# Patient Record
Sex: MALE | Race: WHITE | Employment: OTHER | URBAN - METROPOLITAN AREA
[De-identification: names, ages, dates, MRNs, and addresses within clinical notes are randomized per-mention and may not be internally consistent; named-entity substitution may affect disease eponyms.]

---

## 2020-11-16 ENCOUNTER — HOSPITAL ENCOUNTER (OUTPATIENT)
Age: 70
Setting detail: SPECIMEN
Discharge: HOME | End: 2020-11-16
Payer: MEDICARE

## 2020-11-16 PROCEDURE — 87070 CULTURE OTHR SPECIMN AEROBIC: CPT

## 2020-11-16 PROCEDURE — 87075 CULTR BACTERIA EXCEPT BLOOD: CPT

## 2020-11-22 LAB
CULTURE: NORMAL
Lab: NORMAL
SPECIMEN: NORMAL

## 2021-02-01 LAB
ALBUMIN SERPL-MCNC: 4.6 G/DL
ALP BLD-CCNC: 73 U/L
ALT SERPL-CCNC: 21 U/L
ANION GAP SERPL CALCULATED.3IONS-SCNC: NORMAL MMOL/L
AST SERPL-CCNC: 16 U/L
BASOPHILS ABSOLUTE: 0.1 /ΜL
BASOPHILS RELATIVE PERCENT: 1 %
BILIRUB SERPL-MCNC: 0.6 MG/DL (ref 0.1–1.4)
BUN BLDV-MCNC: 15 MG/DL
CALCIUM SERPL-MCNC: 9.5 MG/DL
CHLORIDE BLD-SCNC: 106 MMOL/L
CO2: 25 MMOL/L
CREAT SERPL-MCNC: 1.01 MG/DL
EOSINOPHILS ABSOLUTE: 0.1 /ΜL
EOSINOPHILS RELATIVE PERCENT: 2 %
GFR CALCULATED: NORMAL
GLUCOSE BLD-MCNC: 124 MG/DL
HCT VFR BLD CALC: 48.3 % (ref 41–53)
HEMOGLOBIN: 17.3 G/DL (ref 13.5–17.5)
LYMPHOCYTES ABSOLUTE: 1.8 /ΜL
LYMPHOCYTES RELATIVE PERCENT: 30 %
MCH RBC QN AUTO: 33.7 PG
MCHC RBC AUTO-ENTMCNC: 35.8 G/DL
MCV RBC AUTO: 94 FL
MONOCYTES ABSOLUTE: 0.6 /ΜL
MONOCYTES RELATIVE PERCENT: 10 %
NEUTROPHILS ABSOLUTE: 3.5 /ΜL
NEUTROPHILS RELATIVE PERCENT: 57 %
PLATELET # BLD: 144 K/ΜL
PMV BLD AUTO: NORMAL FL
POTASSIUM SERPL-SCNC: 3.9 MMOL/L
RBC # BLD: 5.14 10^6/ΜL
SODIUM BLD-SCNC: 149 MMOL/L
TOTAL PROTEIN: 6.6
WBC # BLD: 6.1 10^3/ML

## 2021-02-11 ENCOUNTER — OFFICE VISIT (OUTPATIENT)
Dept: CARDIOLOGY CLINIC | Age: 71
End: 2021-02-11
Payer: MEDICARE

## 2021-02-11 ENCOUNTER — PROCEDURE VISIT (OUTPATIENT)
Dept: CARDIOLOGY CLINIC | Age: 71
End: 2021-02-11
Payer: MEDICARE

## 2021-02-11 VITALS
HEIGHT: 72 IN | DIASTOLIC BLOOD PRESSURE: 90 MMHG | BODY MASS INDEX: 33.54 KG/M2 | WEIGHT: 247.6 LBS | SYSTOLIC BLOOD PRESSURE: 146 MMHG | HEART RATE: 73 BPM

## 2021-02-11 DIAGNOSIS — I10 ESSENTIAL HYPERTENSION: ICD-10-CM

## 2021-02-11 DIAGNOSIS — Z01.818 PRE-OPERATIVE CLEARANCE: Primary | ICD-10-CM

## 2021-02-11 DIAGNOSIS — E78.5 HYPERLIPIDEMIA, UNSPECIFIED HYPERLIPIDEMIA TYPE: ICD-10-CM

## 2021-02-11 DIAGNOSIS — Z01.818 PRE-OP EVALUATION: Primary | ICD-10-CM

## 2021-02-11 DIAGNOSIS — R94.31 ABNORMAL EKG: ICD-10-CM

## 2021-02-11 DIAGNOSIS — M17.9 OSTEOARTHRITIS OF KNEE, UNSPECIFIED LATERALITY, UNSPECIFIED OSTEOARTHRITIS TYPE: ICD-10-CM

## 2021-02-11 DIAGNOSIS — R01.1 MURMUR, CARDIAC: ICD-10-CM

## 2021-02-11 DIAGNOSIS — E66.09 CLASS 1 OBESITY DUE TO EXCESS CALORIES WITH BODY MASS INDEX (BMI) OF 33.0 TO 33.9 IN ADULT, UNSPECIFIED WHETHER SERIOUS COMORBIDITY PRESENT: ICD-10-CM

## 2021-02-11 LAB
LV EF: 45 %
LV EF: 58 %
LVEF MODALITY: NORMAL
LVEF MODALITY: NORMAL

## 2021-02-11 PROCEDURE — 93016 CV STRESS TEST SUPVJ ONLY: CPT | Performed by: INTERNAL MEDICINE

## 2021-02-11 PROCEDURE — 93000 ELECTROCARDIOGRAM COMPLETE: CPT | Performed by: INTERNAL MEDICINE

## 2021-02-11 PROCEDURE — 93017 CV STRESS TEST TRACING ONLY: CPT | Performed by: INTERNAL MEDICINE

## 2021-02-11 PROCEDURE — A9500 TC99M SESTAMIBI: HCPCS | Performed by: INTERNAL MEDICINE

## 2021-02-11 PROCEDURE — 99204 OFFICE O/P NEW MOD 45 MIN: CPT | Performed by: INTERNAL MEDICINE

## 2021-02-11 PROCEDURE — 3017F COLORECTAL CA SCREEN DOC REV: CPT | Performed by: INTERNAL MEDICINE

## 2021-02-11 PROCEDURE — 93018 CV STRESS TEST I&R ONLY: CPT | Performed by: INTERNAL MEDICINE

## 2021-02-11 PROCEDURE — G8484 FLU IMMUNIZE NO ADMIN: HCPCS | Performed by: INTERNAL MEDICINE

## 2021-02-11 PROCEDURE — 1036F TOBACCO NON-USER: CPT | Performed by: INTERNAL MEDICINE

## 2021-02-11 PROCEDURE — 4040F PNEUMOC VAC/ADMIN/RCVD: CPT | Performed by: INTERNAL MEDICINE

## 2021-02-11 PROCEDURE — 93306 TTE W/DOPPLER COMPLETE: CPT | Performed by: INTERNAL MEDICINE

## 2021-02-11 PROCEDURE — 78452 HT MUSCLE IMAGE SPECT MULT: CPT | Performed by: INTERNAL MEDICINE

## 2021-02-11 PROCEDURE — G8417 CALC BMI ABV UP PARAM F/U: HCPCS | Performed by: INTERNAL MEDICINE

## 2021-02-11 PROCEDURE — G8427 DOCREV CUR MEDS BY ELIG CLIN: HCPCS | Performed by: INTERNAL MEDICINE

## 2021-02-11 PROCEDURE — 1123F ACP DISCUSS/DSCN MKR DOCD: CPT | Performed by: INTERNAL MEDICINE

## 2021-02-11 RX ORDER — AMLODIPINE BESYLATE 10 MG/1
10 TABLET ORAL DAILY
COMMUNITY

## 2021-02-11 RX ORDER — METOPROLOL TARTRATE 50 MG/1
50 TABLET, FILM COATED ORAL 2 TIMES DAILY
COMMUNITY

## 2021-02-11 RX ORDER — OLMESARTAN MEDOXOMIL AND HYDROCHLOROTHIAZIDE 40/12.5 40; 12.5 MG/1; MG/1
40 TABLET ORAL DAILY
COMMUNITY
Start: 2021-01-07

## 2021-02-11 RX ORDER — NIACINAMIDE 500 MG
500 TABLET ORAL DAILY
COMMUNITY

## 2021-02-11 NOTE — PROGRESS NOTES
Kandy Ba MD                                  CARDIOLOGY  NOTE        Referring Physician: Fatmata Warren MD    Thank you for consultation. Chief Complaint:    Chief Complaint   Patient presents with    New Patient    Hypertension    Pre op risk assessment     HPI:     Ganesh Hernandez is a 69y.o. year old male with prior medical history significant for resistant hypertension, osteoarthritis of the left knee presents to the clinic to establish care and for preop risk assessment. Patient denies any prior established history of coronary artery disease congestive heart failure or arrhythmias  Patient is a lifelong non-smoker denies any alcohol or drug abuse. Patient has chronic osteoarthritis of the knee for which she anticipates surgery in the next 2 weeks. Due to his osteoarthritis patient has limited ambulation and can hardly walk 8 to 10 feet due to pain. At rest patient denies any chest pain dyspnea or palpitations. Patient functional capacity could not be gauged due to limited mobility. EKG shows normal sinus rhythm without any ischemic changes nonspecific ST-T changes noted. Current Outpatient Medications   Medication Sig Dispense Refill    olmesartan-hydroCHLOROthiazide (BENICAR HCT) 40-12.5 MG per tablet Take 40 tablets by mouth daily      metoprolol tartrate (LOPRESSOR) 50 MG tablet Take 50 mg by mouth 2 times daily      amLODIPine (NORVASC) 10 MG tablet Take 10 mg by mouth daily      niacinamide 500 MG tablet Take 500 mg by mouth daily       No current facility-administered medications for this visit. Allergies:     Penicillins    Patient History:    Past Medical History:   Diagnosis Date    History of EKG 02/01/2021    History of EKG 09/22/2017     History reviewed. No pertinent surgical history. History reviewed. No pertinent family history.   Social History     Tobacco Use    Smoking status: Never Smoker    Smokeless tobacco: Never Used Substance Use Topics    Alcohol use: Not on file        Review of Systems:     · Constitutional:  No Fever or Weight Loss   · Eyes: No Decreased Vision  · ENT: No Headaches, Hearing Loss or Vertigo  · Cardiovascular: No Chest Pain,  No Shortness of breath, No Palpitations. No Edema   · Respiratory: No cough or wheezing . No Respiratory distress   · Gastrointestinal: No abdominal pain, appetite loss, blood in stools, constipation, diarrhea or heartburn  · Genitourinary: No dysuria, trouble voiding, or hematuria  · Musculoskeletal:  denies any new  joint aches , or pain   · Integumentary: No rash or pruritis  · Neurological: No TIA or stroke symptoms  · Psychiatric: No anxiety or depression  · Endocrine: No malaise, fatigue or temperature intolerance  · Hematologic/Lymphatic: No bleeding problems, blood clots or swollen lymph nodes  · Allergic/Immunologic: No nasal congestion or hives        Objective:      Physical Exam:    BP (!) 146/90   Pulse 73   Ht 6' (1.829 m)   Wt 247 lb 9.6 oz (112.3 kg)   BMI 33.58 kg/m²   Wt Readings from Last 3 Encounters:   02/11/21 247 lb 9.6 oz (112.3 kg)     Body mass index is 33.58 kg/m². Vitals:    02/11/21 0832   BP: (!) 146/90   Pulse: 73        General Appearance and Constitutional: Conversant, Well developed, Well nourished, No acute distress, Non-toxic appearance. HEENT:  Normocephalic, Atraumatic, Bilateral external ears normal, Oropharynx moist, No oral exudates,   Nose normal.   Neck- Normal range of motion, No tenderness, Supple  Eyes:  EOMI, Conjunctiva normal, No discharge. Respiratory:  Normal breath sounds, No respiratory distress, No wheezing, No Rales, No Ronchi. No chest tenderness. Cardiovascular: S1-S2, no added heart sounds, soft systolic Mumur appreciated. No gallops, rubs. No Pedal Edema   GI:  Bowel sounds normal, Soft, No tenderness,  :  No costovertebral angle tenderness   Musculoskeletal:  No gross deformities.  Back- No tenderness Integument:  Well hydrated, no rash   Lymphatic:  No lymphadenopathy noted   Neurologic:  Alert & oriented x 3, Normal motor function, normal sensory function, no focal deficits noted   Psychiatric:  Speech and behavior appropriate       Medical decision making and Data review:    DATA:    No results found for: TROPONINT  BNP:  No results found for: PROBNP  PT/INR:  No results found for: PTINR  No results found for: LABA1C  No results found for: CHOL, TRIG, HDL, LDLCALC, LDLDIRECT  Lab Results   Component Value Date    WBC 6.1 02/01/2021    HGB 17.3 02/01/2021    HCT 48.3 02/01/2021    MCV 94 02/01/2021     02/01/2021     TSH: No results found for: TSH  Lab Results   Component Value Date    AST 16 02/01/2021    ALT 21 02/01/2021    BILIDIR 0.2 10/31/2012    BILITOT 0.6 02/01/2021    ALKPHOS 73 02/01/2021         All labs, medications and tests reviewed by myself including data and history from outside source , patient and available family . 1. Pre-operative clearance    2. Essential hypertension    3. Osteoarthritis of knee, unspecified laterality, unspecified osteoarthritis type    4. Hyperlipidemia, unspecified hyperlipidemia type    5. Murmur, cardiac    6. Class 1 obesity due to excess calories with body mass index (BMI) of 33.0 to 33.9 in adult, unspecified whether serious comorbidity present         Impression and Plan:      1. Preop cardiac risk assessment: Patient has risk factors including hypertension and hyperlipidemia, male sex and advanced age. Due to limited mobility functional capacity could not be gauged. Patient can hardly walk 8 to 10 feet before stopping due to pain. Will obtain Lexiscan MPI for risk stratification. 2. Soft systolic murmur: Likely functional murmur, will obtain echocardiogram to rule out any valvular heart disease/structural heart disease. 3. Hypertension: Blood pressure 146/90 today in the office. However patient mentions his blood pressure is fairly stable at home with systolic in the range of 934Q to 130s. Continue with amlodipine, metoprolol, Benicar. 4. Hyperlipidemia patient is on niacinamide. Management as per primary care physician. 5. Obesity with BMI of 33.58: Patient was counseled for low-salt low-fat diet. Exercise as tolerated. Return in about 1 month (around 3/11/2021). Counseled extensively and medication compliance urged. We discussed that for the  prevention of ASCVD our  goal is aggressive risk modification. Patient is encouraged to exercise even a brisk walk for 30 minutes  at least 3 to 4 times a week   Various goals were discussed and questions answered. Continue current medications. Appropriate prescriptions are addressed and refills ordered. Questions answered and patient verbalizes understanding. Call for any problems, questions, or concerns.

## 2021-02-11 NOTE — LETTER
Keara Ro  [birth year]  [id number]    Have you had any Chest Pain that is not new? - No      ? DO EKG IF: Patient has a Heart Rate above 100 or below 40     CAD (Coronary Artery Disease) patient should have one on file every 6 months        Have you had any Shortness of Breath - No    Have you had any dizziness - No    ? Sitting wait 5 minutes do supine (laying down) wait 5 minutes then do standing - log each in \"vitals\" area in Epic  ? Be sure to ask what symptoms they are having if they get dizzy while completing ortho stats such as room spinning, nausea, etc.    Have you had any palpitations that are not new? - No      Do you have any edema -     Do you have a surgery or procedure scheduled in the near future - Yes  If Yes- DO EKG  If Yes - Who is the surgery with?  Dr. Roverto Vora   Phone number of physician   Fax number of physician   Type of surgery Left Total Knee 2/23/2021

## 2021-02-15 ENCOUNTER — TELEPHONE (OUTPATIENT)
Dept: CARDIOLOGY CLINIC | Age: 71
End: 2021-02-15

## 2021-02-15 NOTE — TELEPHONE ENCOUNTER
Notified pt of results he will come in Thursday to see Dr. Juvenal Xiong.              Summary   Left ventricular systolic function is normal with an ejection fraction of   55-60%. Mild septal wall asymmetrical left ventricular hypertrophy. Grade I diastolic dysfunction. Dilated aortic root(3.8 cm). No significant valvular regurgitation noted. Normal pulmonary artery pressure with a RVSP of 28mmHg.    No evidence of pericardial effusion.            Summary    Supervising physician Dr. Darren Nur .    Medium sized defect of mild severity which is persistent involving anterior    wall of myocardium, likely represent myocardial ischemia    Fixed inferior defect secondary to increased hepatic and gut uptake    (artifact).    Low EF 45 %        Recommendation    Abnormal Stress MPI    Recommend Left Heart Cath before elective surgery    OV to discuss

## 2021-02-18 ENCOUNTER — HOSPITAL ENCOUNTER (OUTPATIENT)
Dept: GENERAL RADIOLOGY | Age: 71
Discharge: HOME | End: 2021-02-18
Payer: MEDICARE

## 2021-02-18 ENCOUNTER — OFFICE VISIT (OUTPATIENT)
Dept: CARDIOLOGY CLINIC | Age: 71
End: 2021-02-18
Payer: MEDICARE

## 2021-02-18 ENCOUNTER — HOSPITAL ENCOUNTER (OUTPATIENT)
Age: 71
Discharge: HOME | End: 2021-02-18
Payer: MEDICARE

## 2021-02-18 ENCOUNTER — TELEPHONE (OUTPATIENT)
Dept: CARDIOLOGY CLINIC | Age: 71
End: 2021-02-18

## 2021-02-18 VITALS
DIASTOLIC BLOOD PRESSURE: 84 MMHG | HEIGHT: 72 IN | BODY MASS INDEX: 33.18 KG/M2 | SYSTOLIC BLOOD PRESSURE: 130 MMHG | HEART RATE: 84 BPM | WEIGHT: 245 LBS

## 2021-02-18 DIAGNOSIS — Z01.810 PRE-OPERATIVE CARDIOVASCULAR EXAMINATION: ICD-10-CM

## 2021-02-18 DIAGNOSIS — E78.2 MIXED HYPERLIPIDEMIA: Primary | ICD-10-CM

## 2021-02-18 DIAGNOSIS — R01.1 MURMUR, CARDIAC: ICD-10-CM

## 2021-02-18 DIAGNOSIS — Z01.818 PRE-OP EVALUATION: ICD-10-CM

## 2021-02-18 DIAGNOSIS — Z01.818 PREOP TESTING: ICD-10-CM

## 2021-02-18 DIAGNOSIS — R94.39 ABNORMAL STRESS TEST: ICD-10-CM

## 2021-02-18 DIAGNOSIS — I10 ESSENTIAL HYPERTENSION: ICD-10-CM

## 2021-02-18 DIAGNOSIS — E66.09 CLASS 1 OBESITY DUE TO EXCESS CALORIES WITHOUT SERIOUS COMORBIDITY WITH BODY MASS INDEX (BMI) OF 33.0 TO 33.9 IN ADULT: ICD-10-CM

## 2021-02-18 LAB
ABO/RH: NORMAL
ANION GAP SERPL CALCULATED.3IONS-SCNC: 9 MMOL/L (ref 4–16)
ANTIBODY SCREEN: NEGATIVE
APTT: 27.5 SECONDS (ref 25.1–37.1)
BASOPHILS ABSOLUTE: 0.1 K/CU MM
BASOPHILS RELATIVE PERCENT: 1 % (ref 0–1)
BUN BLDV-MCNC: 15 MG/DL (ref 6–23)
CALCIUM SERPL-MCNC: 9.4 MG/DL (ref 8.3–10.6)
CHLORIDE BLD-SCNC: 102 MMOL/L (ref 99–110)
CO2: 32 MMOL/L (ref 21–32)
COMMENT: NORMAL
CREAT SERPL-MCNC: 1 MG/DL (ref 0.9–1.3)
DIFFERENTIAL TYPE: ABNORMAL
EOSINOPHILS ABSOLUTE: 0.1 K/CU MM
EOSINOPHILS RELATIVE PERCENT: 1.6 % (ref 0–3)
GFR AFRICAN AMERICAN: >60 ML/MIN/1.73M2
GFR NON-AFRICAN AMERICAN: >60 ML/MIN/1.73M2
GLUCOSE BLD-MCNC: 101 MG/DL (ref 70–99)
HCT VFR BLD CALC: 50.5 % (ref 42–52)
HEMOGLOBIN: 17.8 GM/DL (ref 13.5–18)
IMMATURE NEUTROPHIL %: 0.3 % (ref 0–0.43)
INR BLD: 0.94 INDEX
LYMPHOCYTES ABSOLUTE: 1.9 K/CU MM
LYMPHOCYTES RELATIVE PERCENT: 27.4 % (ref 24–44)
MCH RBC QN AUTO: 33.1 PG (ref 27–31)
MCHC RBC AUTO-ENTMCNC: 35.2 % (ref 32–36)
MCV RBC AUTO: 94 FL (ref 78–100)
MONOCYTES ABSOLUTE: 0.6 K/CU MM
MONOCYTES RELATIVE PERCENT: 9 % (ref 0–4)
NUCLEATED RBC %: 0 %
PDW BLD-RTO: 13.2 % (ref 11.7–14.9)
PLATELET # BLD: 151 K/CU MM (ref 140–440)
PMV BLD AUTO: 11.4 FL (ref 7.5–11.1)
POTASSIUM SERPL-SCNC: 3.7 MMOL/L (ref 3.5–5.1)
PROTHROMBIN TIME: 11.4 SECONDS (ref 11.7–14.5)
RBC # BLD: 5.37 M/CU MM (ref 4.6–6.2)
SEGMENTED NEUTROPHILS ABSOLUTE COUNT: 4.3 K/CU MM
SEGMENTED NEUTROPHILS RELATIVE PERCENT: 60.7 % (ref 36–66)
SODIUM BLD-SCNC: 143 MMOL/L (ref 135–145)
TOTAL IMMATURE NEUTOROPHIL: 0.02 K/CU MM
TOTAL NUCLEATED RBC: 0 K/CU MM
WBC # BLD: 7.1 K/CU MM (ref 4–10.5)

## 2021-02-18 PROCEDURE — 3017F COLORECTAL CA SCREEN DOC REV: CPT | Performed by: INTERNAL MEDICINE

## 2021-02-18 PROCEDURE — 4040F PNEUMOC VAC/ADMIN/RCVD: CPT | Performed by: INTERNAL MEDICINE

## 2021-02-18 PROCEDURE — 86901 BLOOD TYPING SEROLOGIC RH(D): CPT

## 2021-02-18 PROCEDURE — 1036F TOBACCO NON-USER: CPT | Performed by: INTERNAL MEDICINE

## 2021-02-18 PROCEDURE — 85025 COMPLETE CBC W/AUTO DIFF WBC: CPT

## 2021-02-18 PROCEDURE — 85730 THROMBOPLASTIN TIME PARTIAL: CPT

## 2021-02-18 PROCEDURE — G8427 DOCREV CUR MEDS BY ELIG CLIN: HCPCS | Performed by: INTERNAL MEDICINE

## 2021-02-18 PROCEDURE — G8484 FLU IMMUNIZE NO ADMIN: HCPCS | Performed by: INTERNAL MEDICINE

## 2021-02-18 PROCEDURE — 85610 PROTHROMBIN TIME: CPT

## 2021-02-18 PROCEDURE — 86850 RBC ANTIBODY SCREEN: CPT

## 2021-02-18 PROCEDURE — G8417 CALC BMI ABV UP PARAM F/U: HCPCS | Performed by: INTERNAL MEDICINE

## 2021-02-18 PROCEDURE — 36415 COLL VENOUS BLD VENIPUNCTURE: CPT

## 2021-02-18 PROCEDURE — 86900 BLOOD TYPING SEROLOGIC ABO: CPT

## 2021-02-18 PROCEDURE — 93005 ELECTROCARDIOGRAM TRACING: CPT | Performed by: INTERNAL MEDICINE

## 2021-02-18 PROCEDURE — 80048 BASIC METABOLIC PNL TOTAL CA: CPT

## 2021-02-18 PROCEDURE — 99213 OFFICE O/P EST LOW 20 MIN: CPT | Performed by: INTERNAL MEDICINE

## 2021-02-18 PROCEDURE — 71046 X-RAY EXAM CHEST 2 VIEWS: CPT

## 2021-02-18 PROCEDURE — 1123F ACP DISCUSS/DSCN MKR DOCD: CPT | Performed by: INTERNAL MEDICINE

## 2021-02-18 NOTE — PROGRESS NOTES
Marlyn Fitch MD                                  CARDIOLOGY  NOTE        Chief Complaint:    Chief Complaint   Patient presents with    Results     pt. here today to review echo and abn NM testing. Pt. denies chest pain, palpitations, SOB, dizziness and edema.  Cardiac Clearance     pt. is scheduled to have L total knee replacement on 2/23/21    Pre op risk assessment     Denies any chest pain shortness of breath or palpitations      Stress MPI 2/11/2021     Conclusions       Dorethia Jeans physician Dr. Yesenia Perez .    Medium sized defect of mild severity which is persistent involving anterior    wall of myocardium, likely represent myocardial ischemia    Fixed inferior defect secondary to increased hepatic and gut uptake    (artifact).    Low EF 45 %        Recommendation    Abnormal Stress MPI    Recommend Left Heart Cath before elective surgery    OV to discuss     Echocardiogram 2/11/2021     Summary   Left ventricular systolic function is normal with an ejection fraction of   55-60%. Mild septal wall asymmetrical left ventricular hypertrophy. Grade I diastolic dysfunction. Dilated aortic root(3.8 cm). No significant valvular regurgitation noted. Normal pulmonary artery pressure with a RVSP of 28mmHg. No evidence of pericardial effusion. Recent HPI:     Laci Greenwood is a 69y.o. year old male with prior medical history significant for resistant hypertension, osteoarthritis of the left knee presents to the clinic to establish care and for preop risk assessment. Patient denies any prior established history of coronary artery disease congestive heart failure or arrhythmias  Patient is a lifelong non-smoker denies any alcohol or drug abuse. Patient has chronic osteoarthritis of the knee for which she anticipates surgery in the next 2 weeks. Due to his osteoarthritis patient has limited ambulation and can hardly walk 8 to 10 feet due to pain. At rest patient denies any chest pain dyspnea or palpitations. Patient functional capacity could not be gauged due to limited mobility. EKG shows normal sinus rhythm without any ischemic changes nonspecific ST-T changes noted. Current Outpatient Medications   Medication Sig Dispense Refill    olmesartan-hydroCHLOROthiazide (BENICAR HCT) 40-12.5 MG per tablet Take 40 tablets by mouth daily      metoprolol tartrate (LOPRESSOR) 50 MG tablet Take 50 mg by mouth 2 times daily      amLODIPine (NORVASC) 10 MG tablet Take 10 mg by mouth daily      niacinamide 500 MG tablet Take 500 mg by mouth daily       No current facility-administered medications for this visit. Allergies:     Penicillins    Patient History:    Past Medical History:   Diagnosis Date    Abnormal nuclear stress test 02/11/2021    Medium sized defect of mild severity which is persistent involving anterior  wall of myocardium, likely represent myocardial ischemia.  Essential hypertension     H/O echocardiogram 02/11/2021    Mild LVH, No significant valvular regurgitation noted.  History of EKG 02/01/2021    Mixed hyperlipidemia     Murmur, cardiac     Obesity      History reviewed. No pertinent surgical history. History reviewed. No pertinent family history. Social History     Tobacco Use    Smoking status: Never Smoker    Smokeless tobacco: Never Used   Substance Use Topics    Alcohol use: Not on file        Review of Systems:     · Constitutional:  No Fever or Weight Loss   · Eyes: No Decreased Vision  · ENT: No Headaches, Hearing Loss or Vertigo  · Cardiovascular: No Chest Pain,  No Shortness of breath, No Palpitations. No Edema   · Respiratory: No cough or wheezing .  No Respiratory distress   · Gastrointestinal: No abdominal pain, appetite loss, blood in stools, constipation, diarrhea or heartburn  · Genitourinary: No dysuria, trouble voiding, or hematuria  · Musculoskeletal:  denies any new  joint aches , or pain · Integumentary: No rash or pruritis  · Neurological: No TIA or stroke symptoms  · Psychiatric: No anxiety or depression  · Endocrine: No malaise, fatigue or temperature intolerance  · Hematologic/Lymphatic: No bleeding problems, blood clots or swollen lymph nodes  · Allergic/Immunologic: No nasal congestion or hives        Objective:      Physical Exam:    /84   Pulse 84   Ht 6' (1.829 m)   Wt 245 lb (111.1 kg)   BMI 33.23 kg/m²   Wt Readings from Last 3 Encounters:   02/18/21 245 lb (111.1 kg)   02/11/21 247 lb 9.6 oz (112.3 kg)     Body mass index is 33.23 kg/m². Vitals:    02/18/21 1459   BP: 130/84   Pulse: 84        General Appearance and Constitutional: Conversant, Well developed, Well nourished, No acute distress, Non-toxic appearance. HEENT:  Normocephalic, Atraumatic, Bilateral external ears normal, Oropharynx moist, No oral exudates,   Nose normal.   Neck- Normal range of motion, No tenderness, Supple  Eyes:  EOMI, Conjunctiva normal, No discharge. Respiratory:  Normal breath sounds, No respiratory distress, No wheezing, No Rales, No Ronchi. No chest tenderness. Cardiovascular: S1-S2, no added heart sounds, soft systolic Mumur appreciated. No gallops, rubs. No Pedal Edema   GI:  Bowel sounds normal, Soft, No tenderness,  :  No costovertebral angle tenderness   Musculoskeletal:  No gross deformities.  Back- No tenderness  Integument:  Well hydrated, no rash   Lymphatic:  No lymphadenopathy noted   Neurologic:  Alert & oriented x 3, Normal motor function, normal sensory function, no focal deficits noted   Psychiatric:  Speech and behavior appropriate       Medical decision making and Data review:    DATA:    No results found for: TROPONINT  BNP:  No results found for: PROBNP  PT/INR:  No results found for: PTINR  No results found for: LABA1C  No results found for: CHOL, TRIG, HDL, LDLCALC, LDLDIRECT  Lab Results   Component Value Date    WBC 6.1 02/01/2021    HGB 17.3 02/01/2021 HCT 48.3 02/01/2021    MCV 94 02/01/2021     02/01/2021     TSH: No results found for: TSH  Lab Results   Component Value Date    AST 16 02/01/2021    ALT 21 02/01/2021    BILIDIR 0.2 10/31/2012    BILITOT 0.6 02/01/2021    ALKPHOS 73 02/01/2021         All labs, medications and tests reviewed by myself including data and history from outside source , patient and available family . 1. Mixed hyperlipidemia    2. Essential hypertension    3. Class 1 obesity due to excess calories without serious comorbidity with body mass index (BMI) of 33.0 to 33.9 in adult    4. Pre-op evaluation    5. Murmur, cardiac    6. Abnormal stress test         Impression and Plan:      1. Preop cardiac risk assessment: Patient has risk factors including hypertension and hyperlipidemia, male sex and advanced age. Brittany Ramos MPI for risk stratification was obtained, which is abnormal - anterior wall ischemia. Will obtain LHC to further delineate coronary anatomy. Pros and cons were discussed with the patient. Patient consents to the procedure  2. Soft systolic murmur: Echocardiogram normal. Likely functional murmur  3. Hypertension: Blood pressure 130/84 today in the office. However patient mentions his blood pressure is fairly stable at home with systolic in the range of 214R to 130s. Continue with amlodipine, metoprolol, Benicar. 4. Hyperlipidemia patient is on niacinamide. Management as per primary care physician. 5. Obesity with BMI of 33.58: Patient was counseled for low-salt low-fat diet. Exercise as tolerated. Patient knee surgery is anticipated Tuesday   Will get [facility] Monday       Return in about 1 month (around 3/18/2021). Counseled extensively and medication compliance urged. We discussed that for the  prevention of ASCVD our  goal is aggressive risk modification. Patient is encouraged to exercise even a brisk walk for 30 minutes  at least 3 to 4 times a week   Various goals were discussed and questions answered. Continue current medications. Appropriate prescriptions are addressed and refills ordered. Questions answered and patient verbalizes understanding. Call for any problems, questions, or concerns.

## 2021-02-18 NOTE — LETTER
Radha Baldwin  [birth year]  [id number]    Have you had any Chest Pain that is not new? - No    Have you had any Shortness of Breath - No    Have you had any dizziness - No    Have you had any palpitations that are not new? - No        Is the patient on any of the following medications - no    Do you have any edema - swelling in No    If Yes - CHECK TO SEE IF THE EDEMA IS PITTING    If Yes - Have they worn compression stockings No    Vein \"LEG PROBLEM Questionnaire\"  1. Do you have prominent leg veins? No   2. Do you have any skin discoloration? No  3. Do you have any healed or active sores? No  4. Do you have swelling of the legs? No  5. Do you have a family history of varicose veins? No  6. Does your profession involve pro-longed        standing or heavy lifting? No  7. Have you been fighting overweight problems? No  8. Do you have restless legs? No  9. Do you have any night time cramps? No  10. Do you have any of the following in your legs:       No  Do you have a surgery or procedure scheduled in the near future - Yes    If Yes - Who is the surgery with?  Dr. Kirsten Castro  Phone number of physician   Fax number of physician   Type of surgery L total knee replacement

## 2021-02-18 NOTE — LETTER
Isabela Plata 67 WITH POSSIBLE PERCUTANEOUS CORONARY INTERVENTION     Patient Name: Shadi Daily   : 1950   MRN# [id number]    Date of Procedure: 21 Time: 12 Arrival Time: 930    The catheterization and angiogram are usually outpatient procedures, however if stenting is needed you will stay overnight. You will need to be at the hospital two hours before the procedure. You will need to arrange for someone to drive you home. You will go to registration in the main lobby. HOSPITAL:  [facility])  Call to [facility] at: [phone] 1-2 days before your procedure. Please have blood work and chest-x-ray done 1 to 2 days before procedure at    Psychiatric. X Please do not have anything by mouth after midnight prior to or 8 hours before the procedure. X You may take your medications with a sip of water in the morning before your procedure or  take them with you. X If you are taking olmesartan HCTZ (Hydrochlorothiazide) please do not take it the morning before your procedure. X Please have COVID-19 Test done on upon arrival  after labs and Xray are completed. You will need to Quarantine yourself until procedure. Patient Signature:  _________________________ Staff Signature: Itzel Jauregui   Staff Given Instructions:_______________________________        Isabela Boswell",     Patient Name: Shadi Daily   : 1950   MRN# [id number]    Date of Procedure: 21 Time: 12     DIAGNOSIS:   Z01.810    LEFT HEART CATHETERIZATION WITH POSSIBLE PERCUTANEOUS CORONARY INTERVENTION       X Chest x-Ray PA & Lateral View     XEKG   X Type & Screen     X CBC  X BMP  X PT  X PTT            ? PLEASE CALL ABNORMAL RESULTS TO THE  PHYSICIAN? ATTENTION PATIENT: Pretesting is to be done before the cath. You do not have to fast for the lab work. You must go to the [facility] behind [facility] at [address] to have this lab work done. Phone: [phone] Hours: 7:00 am to 5:00 pm                           PHYSICIAN SIGNATURE:      DATE:_________________Time:______________                  Jhonny Mendenhall  Pre Cath Lab Orders   Patient Name: Manju Fischer   : 1950   MRN# [id number]  Pre Cath Lab orders   1. IV of 0.9 NS@ 75ml/hr started 2 hours prior to procedure. (#20 Gauge Insyte or larger)  2. Diazepam (Valium) 5 mg po ONCE in Cath Lab. 3. Diphenhydramine (Benadryl) 25 mg ONCE. PHYSICIAN SIGNATURE:      DATE:   TIME:______________                                                     [facility] Informed Consent for Anesthesia/Sedation, Surgery, Invasive Procedures, and other High-risk Interventions and Medication use      *This consent is applicable for 30 days following patient signature*    Procedure(s)   IManju authorize, Dr. Herminia Loredo  and the associate(s) or assistant(s) of his/her choice, to perform the following procedure(s): [address]       I know that unexpected conditions may require additional or different procedures than those above. I authorize the above named practitioner(s) perform these as necessary and desirable. This is based on the practitioners professional judgment. The above named practitioner has discussed the above procedure(s) with me, including:  ? Potential benefits, including likelihood of success of the procedure(s) goals  ? Risks  ? Side effects, risk of death, and risk of infection  ? Any potential problems that might occur during recuperation or healing post-procedure  ?  Reasonable alternatives ? Risks of NOT performing the procedure(s)    I acknowledge that no warranty or guarantee has been made to the results the procedure(s). I consent to the above named practitioner(s) providing additional services to me as deemed reasonable and necessary, including but not limited to:    ? Use of medications for anesthesia or sedation. ? All anesthesia and sedation carry risks. My practitioner has discussed my anticipated anesthesia and/or sedation and the risks of using, risk of not using, benefits, side effects, and alternatives. ? Use of pathology  ? I authorize [facility]) to dispose of tissues, specimens or organs when pathology is complete. ? Use of radiology  ? A contrast agent may be required for radiology procedures. My practitioner has advised me of the risks of using, risks of not using, benefits, side effects, and alternatives. ? Observers or use of photography, video/audio recording, or televising of the procedure(s). This is for medical, scientific, or educational purposes. This includes appropriate portions of my body. My identity will not be revealed. ? I consent to release of my social security number and other identifying information to [employer] (FDA), and the supplier/, if I receive tissue, a device, or implant. This is to track the tissue, device, or implant for defect, recall, infection, etc.     ? Use of blood and/or blood products, if needed, through my hospital stay. My practitioner has advised me of the risks of using, risks of not using, benefits, side effects, and alternatives. ___ I do NOT want Blood or Blood products given. (Complete separate  refusal form)    Code Status (gil one):  ___ I do NOT HAVE a DNR order. I am a Full-code.   I will receive CPR, intubation,  chest compressions, medications, and/or other life saving measures if I have a  cardiac or respiratory arrest. ___ I have a Do Not Resuscitate (DNR)order.   (gil one below)  ___  I rescind my DNR for surgery and immediate post-operative period through Phase 2 recovery. This means, for that time period, I will be a Full-code and receive CPR, intubation, chest compressions, medications, and/or other life saving measures, if I have a cardiac or respiratory arrest.    ___ I WANT to keep my DNR in effect during my procedure(s) and immediate post-operative recovery period through Phase 2 recovery. (Complete separate refusal form)     This form has been fully explained to me. I understand its contents. Patients Signature: ___________________________Date: ________  Time: ________    If patient unable to sign, has engaged the [address] [place] [address], is a minor, or has a court-appointed Guardian:  [facility] Representative Name (Print):  ____________________________________      Relationship (Amberg one):    Guardian   Parent    Spouse    HCPOA   Child   Sibling  Next-of-Kin Friend    Patients Representative Signature: _______________________________________              Date: ______________  Time: __________    An  was used.    name/ID: _________________________________      [facility] ([facility]) Witness________________________  Date: ________   Time: _________    Physician/Practitioner _______________________  Date: ________   Time: _________     Revision 2017    [place] [facility]    Dr. Freddie Galeas TO SCHEDULE:    LEFT HEART CATHETERIZATION WITH POSSIBLE PERCUTANEOUS CORONARY INTERVENTION     Patient Name: Shadi Daily   : 1950   MRN# [id number]    Home Phone Number: [phone]   Weight:    Wt Readings from Last 3 Encounters:   21 245 lb (111.1 kg)   21 247 lb 9.6 oz (112.3 kg)        Insurance: Payor: MEDICARE / Plan: MEDICARE PART A AND B / Product Type: *No Product type* /     Date of Procedure: 21 Time: 12 Arrival Time: 930 Diagnosis:  Abn nm  Allergies: Allergies   Allergen Reactions    Penicillins Other (See Comments)        1) [facility] scheduling [phone]) or [address] [facility]     PHONE OR   INSTANT MESSAGE  2) PREAUTHORIZATION NUMBER:    Spoke to:      From date:     expiration date:        [address] Unit TO PATIENTS: Prior Authorization  We will contact your insurance for prior authorization for the CPT code related to the procedure it is the patient's responsibility to contact their insurance to ensure they are following their medical plans provisions or requirements!

## 2021-02-18 NOTE — TELEPHONE ENCOUNTER
Pt here in office & educated on [facility] for Dx: nora nm, scheduled for 2/22/21 @ 12, w/arrival @ 930, @ [facility]; risks explained; & consents signed. Pre-admission orders given to pt for labs & CXR due [facility] @ [address] [facility]. Instructions given to pt to: please do not take olmesartan- hctz the morning before procedure. NPO after midnight night before procedure; Call hospital @ [phone] to pre-register; May take rest of morning meds. am of procedure; & pt voiced understanding. Copies of consent, pre-testing orders, & info. sheet given to [facility] for scanning.

## 2021-02-19 LAB
EKG ATRIAL RATE: 79 BPM
EKG DIAGNOSIS: NORMAL
EKG P AXIS: 67 DEGREES
EKG P-R INTERVAL: 190 MS
EKG Q-T INTERVAL: 396 MS
EKG QRS DURATION: 94 MS
EKG QTC CALCULATION (BAZETT): 454 MS
EKG R AXIS: -27 DEGREES
EKG T AXIS: 32 DEGREES
EKG VENTRICULAR RATE: 79 BPM

## 2021-02-19 PROCEDURE — 93010 ELECTROCARDIOGRAM REPORT: CPT | Performed by: INTERNAL MEDICINE

## 2021-02-22 ENCOUNTER — TELEPHONE (OUTPATIENT)
Dept: CARDIOLOGY CLINIC | Age: 71
End: 2021-02-22

## 2021-02-22 ENCOUNTER — HOSPITAL ENCOUNTER (OUTPATIENT)
Dept: CARDIAC CATH/INVASIVE PROCEDURES | Age: 71
Discharge: HOME | End: 2021-02-22
Attending: INTERNAL MEDICINE | Admitting: INTERNAL MEDICINE
Payer: MEDICARE

## 2021-02-22 VITALS
TEMPERATURE: 97.3 F | HEART RATE: 75 BPM | SYSTOLIC BLOOD PRESSURE: 124 MMHG | OXYGEN SATURATION: 95 % | DIASTOLIC BLOOD PRESSURE: 81 MMHG | RESPIRATION RATE: 18 BRPM

## 2021-02-22 LAB
ACTIVATED CLOTTING TIME, LOW RANGE: 198 SEC
SARS-COV-2, NAAT: NOT DETECTED

## 2021-02-22 PROCEDURE — 2580000003 HC RX 258: Performed by: INTERNAL MEDICINE

## 2021-02-22 PROCEDURE — 2709999900 HC NON-CHARGEABLE SUPPLY

## 2021-02-22 PROCEDURE — 2500000003 HC RX 250 WO HCPCS

## 2021-02-22 PROCEDURE — 93458 L HRT ARTERY/VENTRICLE ANGIO: CPT

## 2021-02-22 PROCEDURE — C1769 GUIDE WIRE: HCPCS

## 2021-02-22 PROCEDURE — 85347 COAGULATION TIME ACTIVATED: CPT

## 2021-02-22 PROCEDURE — 6360000002 HC RX W HCPCS

## 2021-02-22 PROCEDURE — C1894 INTRO/SHEATH, NON-LASER: HCPCS

## 2021-02-22 PROCEDURE — 6370000000 HC RX 637 (ALT 250 FOR IP): Performed by: INTERNAL MEDICINE

## 2021-02-22 PROCEDURE — 6360000004 HC RX CONTRAST MEDICATION

## 2021-02-22 PROCEDURE — 87635 SARS-COV-2 COVID-19 AMP PRB: CPT

## 2021-02-22 RX ORDER — DIAZEPAM 5 MG/1
5 TABLET ORAL ONCE
Status: COMPLETED | OUTPATIENT
Start: 2021-02-22 | End: 2021-02-22

## 2021-02-22 RX ORDER — SODIUM CHLORIDE 9 MG/ML
INJECTION, SOLUTION INTRAVENOUS CONTINUOUS
Status: DISCONTINUED | OUTPATIENT
Start: 2021-02-22 | End: 2021-02-22 | Stop reason: HOSPADM

## 2021-02-22 RX ORDER — DIPHENHYDRAMINE HCL 25 MG
25 TABLET ORAL ONCE
Status: COMPLETED | OUTPATIENT
Start: 2021-02-22 | End: 2021-02-22

## 2021-02-22 RX ADMIN — DIAZEPAM 5 MG: 5 TABLET ORAL at 10:37

## 2021-02-22 RX ADMIN — DIPHENHYDRAMINE HYDROCHLORIDE 25 MG: 25 TABLET ORAL at 10:37

## 2021-02-22 RX ADMIN — SODIUM CHLORIDE: 9 INJECTION, SOLUTION INTRAVENOUS at 10:37

## 2021-02-22 NOTE — PLAN OF CARE
Patient walked to the bathroom without difficulty and patients instructions for discharge explained. Patient denies additional needs. Right wrist assessed and no bleeding or hematoma noted. Armboard remains in place. Vitals stable.  Keisha Holloway RN 2/22/2021

## 2021-02-22 NOTE — H&P
Tomasa Kurtz MD                                  CARDIOLOGY  NOTE        Chief Complaint:      + stress test   For [facility] toay      Pre op risk assessment          Stress MPI 2/11/2021     Conclusions   [facility] physician Dr. Morena Ortega .    Medium sized defect of mild severity which is persistent involving anterior    wall of myocardium, likely represent myocardial ischemia    Fixed inferior defect secondary to increased hepatic and gut uptake    (artifact).    Low EF 45 %        Recommendation    Abnormal Stress MPI    Recommend Left Heart Cath before elective surgery    OV to discuss     Echocardiogram 2/11/2021     Summary   Left ventricular systolic function is normal with an ejection fraction of   55-60%. Mild septal wall asymmetrical left ventricular hypertrophy. Grade I diastolic dysfunction. Dilated aortic root(3.8 cm). No significant valvular regurgitation noted. Normal pulmonary artery pressure with a RVSP of 28mmHg. No evidence of pericardial effusion. Recent HPI:     Jessica Bowman is a 69y.o. year old male with prior medical history significant for resistant hypertension, osteoarthritis of the left knee presents to the clinic to establish care and for preop risk assessment. Patient denies any prior established history of coronary artery disease congestive heart failure or arrhythmias  Patient is a lifelong non-smoker denies any alcohol or drug abuse. Patient has chronic osteoarthritis of the knee for which she anticipates surgery in the next 2 weeks. Due to his osteoarthritis patient has limited ambulation and can hardly walk 8 to 10 feet due to pain. At rest patient denies any chest pain dyspnea or palpitations. Patient functional capacity could not be gauged due to limited mobility. EKG shows normal sinus rhythm without any ischemic changes nonspecific ST-T changes noted.       Current Facility-Administered Medications   Medication Dose Route Frequency Provider Last Rate Last Admin    0.9 % sodium chloride infusion   Intravenous Continuous Naveed Palencia MD 75 mL/hr at 02/22/21 1037 New Bag at 02/22/21 1037       Allergies:     Penicillins    Patient History:    Past Medical History:   Diagnosis Date    Abnormal nuclear stress test 02/11/2021    Medium sized defect of mild severity which is persistent involving anterior  wall of myocardium, likely represent myocardial ischemia.  Essential hypertension     H/O echocardiogram 02/11/2021    Mild LVH, No significant valvular regurgitation noted.  History of EKG 02/01/2021    Mixed hyperlipidemia     Murmur, cardiac     Obesity      History reviewed. No pertinent surgical history. History reviewed. No pertinent family history. Social History     Tobacco Use    Smoking status: Never Smoker    Smokeless tobacco: Never Used   Substance Use Topics    Alcohol use: Not on file        Review of Systems:     · Constitutional:  No Fever or Weight Loss   · Eyes: No Decreased Vision  · ENT: No Headaches, Hearing Loss or Vertigo  · Cardiovascular: No Chest Pain,  No Shortness of breath, No Palpitations. No Edema   · Respiratory: No cough or wheezing .  No Respiratory distress   · Gastrointestinal: No abdominal pain, appetite loss, blood in stools, constipation, diarrhea or heartburn  · Genitourinary: No dysuria, trouble voiding, or hematuria  · Musculoskeletal:  denies any new  joint aches , or pain   · Integumentary: No rash or pruritis  · Neurological: No TIA or stroke symptoms  · Psychiatric: No anxiety or depression  · Endocrine: No malaise, fatigue or temperature intolerance  · Hematologic/Lymphatic: No bleeding problems, blood clots or swollen lymph nodes  · Allergic/Immunologic: No nasal congestion or hives        Objective:      Physical Exam:    /89   Pulse 73   Temp 97.3 °F (36.3 °C) (Temporal)   Resp 17   SpO2 95%   Wt Readings from Last 3 Encounters:   02/18/21 245 lb (111.1 kg)   02/11/21 247 lb 9.6 oz (112.3 kg)     There is no height or weight on file to calculate BMI. Vitals:    02/22/21 1030   BP: 123/89   Pulse: 73   Resp: 17   Temp: 97.3 °F (36.3 °C)   SpO2: 95%        General Appearance and Constitutional: Conversant, Well developed, Well nourished, No acute distress, Non-toxic appearance. HEENT:  Normocephalic, Atraumatic, Bilateral external ears normal, Oropharynx moist, No oral exudates,   Nose normal.   Neck- Normal range of motion, No tenderness, Supple  Eyes:  EOMI, Conjunctiva normal, No discharge. Respiratory:  Normal breath sounds, No respiratory distress, No wheezing, No Rales, No Ronchi. No chest tenderness. Cardiovascular: S1-S2, no added heart sounds, soft systolic Mumur appreciated. No gallops, rubs. No Pedal Edema   GI:  Bowel sounds normal, Soft, No tenderness,  :  No costovertebral angle tenderness   Musculoskeletal:  No gross deformities. Back- No tenderness  Integument:  Well hydrated, no rash   Lymphatic:  No lymphadenopathy noted   Neurologic:  Alert & oriented x 3, Normal motor function, normal sensory function, no focal deficits noted   Psychiatric:  Speech and behavior appropriate       Medical decision making and Data review:    DATA:    No results found for: TROPONINT  BNP:  No results found for: PROBNP  PT/INR:  No results found for: PTINR  No results found for: LABA1C  No results found for: CHOL, TRIG, HDL, LDLCALC, LDLDIRECT  Lab Results   Component Value Date    WBC 7.1 02/18/2021    HGB 17.8 02/18/2021    HCT 50.5 02/18/2021    MCV 94.0 02/18/2021     02/18/2021     TSH: No results found for: TSH  Lab Results   Component Value Date    AST 16 02/01/2021    ALT 21 02/01/2021    BILIDIR 0.2 10/31/2012    BILITOT 0.6 02/01/2021    ALKPHOS 73 02/01/2021         All labs, medications and tests reviewed by myself including data and history from outside source , patient and available family . Impression and Plan:      1.  Preop cardiac risk assessment: Patient has risk factors including hypertension and hyperlipidemia, male sex and advanced age. Newby Mariluz MPI for risk stratification was obtained, which is abnormal - anterior wall ischemia. Will obtain LHC to further delineate coronary anatomy. Pros and cons were discussed with the patient.   Patient consents to the procedure

## 2021-03-13 PROBLEM — Z01.818 PRE-OP EVALUATION: Status: RESOLVED | Noted: 2021-02-11 | Resolved: 2021-03-13

## 2025-02-17 ENCOUNTER — OFFICE VISIT (OUTPATIENT)
Age: 75
End: 2025-02-17
Payer: MEDICARE

## 2025-02-17 VITALS
OXYGEN SATURATION: 94 % | WEIGHT: 249 LBS | HEART RATE: 78 BPM | DIASTOLIC BLOOD PRESSURE: 78 MMHG | BODY MASS INDEX: 33.77 KG/M2 | SYSTOLIC BLOOD PRESSURE: 138 MMHG

## 2025-02-17 DIAGNOSIS — G25.0 BENIGN ESSENTIAL TREMOR: Primary | ICD-10-CM

## 2025-02-17 PROCEDURE — 3078F DIAST BP <80 MM HG: CPT | Performed by: STUDENT IN AN ORGANIZED HEALTH CARE EDUCATION/TRAINING PROGRAM

## 2025-02-17 PROCEDURE — 3075F SYST BP GE 130 - 139MM HG: CPT | Performed by: STUDENT IN AN ORGANIZED HEALTH CARE EDUCATION/TRAINING PROGRAM

## 2025-02-17 PROCEDURE — 99204 OFFICE O/P NEW MOD 45 MIN: CPT | Performed by: STUDENT IN AN ORGANIZED HEALTH CARE EDUCATION/TRAINING PROGRAM

## 2025-02-17 PROCEDURE — G8427 DOCREV CUR MEDS BY ELIG CLIN: HCPCS | Performed by: STUDENT IN AN ORGANIZED HEALTH CARE EDUCATION/TRAINING PROGRAM

## 2025-02-17 PROCEDURE — 1123F ACP DISCUSS/DSCN MKR DOCD: CPT | Performed by: STUDENT IN AN ORGANIZED HEALTH CARE EDUCATION/TRAINING PROGRAM

## 2025-02-17 PROCEDURE — 4004F PT TOBACCO SCREEN RCVD TLK: CPT | Performed by: STUDENT IN AN ORGANIZED HEALTH CARE EDUCATION/TRAINING PROGRAM

## 2025-02-17 PROCEDURE — G8419 CALC BMI OUT NRM PARAM NOF/U: HCPCS | Performed by: STUDENT IN AN ORGANIZED HEALTH CARE EDUCATION/TRAINING PROGRAM

## 2025-02-17 PROCEDURE — 99203 OFFICE O/P NEW LOW 30 MIN: CPT | Performed by: STUDENT IN AN ORGANIZED HEALTH CARE EDUCATION/TRAINING PROGRAM

## 2025-02-17 PROCEDURE — 3017F COLORECTAL CA SCREEN DOC REV: CPT | Performed by: STUDENT IN AN ORGANIZED HEALTH CARE EDUCATION/TRAINING PROGRAM

## 2025-02-17 PROCEDURE — 1159F MED LIST DOCD IN RCRD: CPT | Performed by: STUDENT IN AN ORGANIZED HEALTH CARE EDUCATION/TRAINING PROGRAM

## 2025-02-17 RX ORDER — POTASSIUM CHLORIDE 1500 MG/1
20 TABLET, EXTENDED RELEASE ORAL DAILY
COMMUNITY
Start: 2024-12-04

## 2025-02-17 NOTE — PROGRESS NOTES
propranolol.  Given metoprolol is currently being used for hypertension, I did encourage he reach out to his PCP for transition of this medication to propranolol.  If his PCP felt that this would not be an ideal transition, then we can consider alternative treatment options such as primidone.  He knows to reach out to my office with any questions or concerns.  Otherwise, I ready will plan to see him back in 3 months time.    Return in about 3 months (around 5/17/2025) for follow up with me.      Thank you for allowing us to participate in the care of your patient.  If there are any questions regarding evaluation please feel free to contact us.     Flora Dai DO, 2/17/2025   >45 minutes total time spent regarding this patient's encounter.  This included obtaining history, performing a neurological medical exam, developing an assessment / plan, and documenting via EMR.

## 2025-06-23 NOTE — PROGRESS NOTES
6/25/25    Bhavik Haynes  1950    Chief Complaint   Patient presents with    Follow-up     Patient here for follow up for tremor.        History of Present Illness  Bhavik is a 75 y.o. male presenting today for follow-up of essential tremor.    He was seen by me in initial consultation in February.  At that appointment we did discuss transitioning his metoprolol to propranolol given better tremor management with propranolol.  We discussed that should this not be recommended from his PCP standpoint that we could consider alternative agent such as primidone.  He was to discuss this further with his PCP.  Today, Bhavik reports he was transitioned to propranolol. He states that his tremor is now 50% or more improved. He does feel that this is sufficient.  He continues to have occasional breakthrough tremor.  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  He is not overtly bothered by this.      Current Outpatient Medications   Medication Sig Dispense Refill    propranolol (INDERAL) 60 MG tablet Take 1 tablet by mouth 2 times daily      Omega-3 Fatty Acids (FISH OIL PO) Take by mouth daily      Multiple Vitamins-Minerals (THERAPEUTIC MULTIVITAMIN-MINERALS) tablet Take 1 tablet by mouth daily      potassium chloride (KLOR-CON M) 20 MEQ extended release tablet Take 1 tablet by mouth daily with food      olmesartan-hydroCHLOROthiazide (BENICAR HCT) 40-12.5 MG per tablet Take 40 tablets by mouth daily      amLODIPine (NORVASC) 10 MG tablet Take 1 tablet by mouth daily       No current facility-administered medications for this visit.         /84 (BP Site: Left Upper Arm, Patient Position: Sitting, BP Cuff Size: Medium Adult)   Pulse 67   Ht 1.829 m (6')   Wt 111.9 kg (246 lb 12.8 oz)   SpO2 97%   BMI 33.47 kg/m²   Physical Exam:  Also present during visit: None.    Mental Status   Orientation: oriented to  Azathioprine Counseling:  I discussed with the patient the risks of azathioprine including but not limited to myelosuppression, immunosuppression, hepatotoxicity, lymphoma, and infections.  The patient understands that monitoring is required including baseline LFTs, Creatinine, possible TPMP genotyping and weekly CBCs for the first month and then every 2 weeks thereafter.  The patient verbalized understanding of the proper use and possible adverse effects of azathioprine.  All of the patient's questions and concerns were addressed.

## 2025-06-25 ENCOUNTER — OFFICE VISIT (OUTPATIENT)
Age: 75
End: 2025-06-25
Payer: MEDICARE

## 2025-06-25 VITALS
HEART RATE: 67 BPM | DIASTOLIC BLOOD PRESSURE: 84 MMHG | OXYGEN SATURATION: 97 % | SYSTOLIC BLOOD PRESSURE: 132 MMHG | BODY MASS INDEX: 33.43 KG/M2 | HEIGHT: 72 IN | WEIGHT: 246.8 LBS

## 2025-06-25 DIAGNOSIS — G25.0 BENIGN ESSENTIAL TREMOR: Primary | ICD-10-CM

## 2025-06-25 PROCEDURE — G8427 DOCREV CUR MEDS BY ELIG CLIN: HCPCS | Performed by: STUDENT IN AN ORGANIZED HEALTH CARE EDUCATION/TRAINING PROGRAM

## 2025-06-25 PROCEDURE — G8419 CALC BMI OUT NRM PARAM NOF/U: HCPCS | Performed by: STUDENT IN AN ORGANIZED HEALTH CARE EDUCATION/TRAINING PROGRAM

## 2025-06-25 PROCEDURE — 3075F SYST BP GE 130 - 139MM HG: CPT | Performed by: STUDENT IN AN ORGANIZED HEALTH CARE EDUCATION/TRAINING PROGRAM

## 2025-06-25 PROCEDURE — 1126F AMNT PAIN NOTED NONE PRSNT: CPT | Performed by: STUDENT IN AN ORGANIZED HEALTH CARE EDUCATION/TRAINING PROGRAM

## 2025-06-25 PROCEDURE — 1159F MED LIST DOCD IN RCRD: CPT | Performed by: STUDENT IN AN ORGANIZED HEALTH CARE EDUCATION/TRAINING PROGRAM

## 2025-06-25 PROCEDURE — 99212 OFFICE O/P EST SF 10 MIN: CPT | Performed by: STUDENT IN AN ORGANIZED HEALTH CARE EDUCATION/TRAINING PROGRAM

## 2025-06-25 PROCEDURE — 1123F ACP DISCUSS/DSCN MKR DOCD: CPT | Performed by: STUDENT IN AN ORGANIZED HEALTH CARE EDUCATION/TRAINING PROGRAM

## 2025-06-25 PROCEDURE — 1036F TOBACCO NON-USER: CPT | Performed by: STUDENT IN AN ORGANIZED HEALTH CARE EDUCATION/TRAINING PROGRAM

## 2025-06-25 PROCEDURE — 3079F DIAST BP 80-89 MM HG: CPT | Performed by: STUDENT IN AN ORGANIZED HEALTH CARE EDUCATION/TRAINING PROGRAM

## 2025-06-25 PROCEDURE — 99213 OFFICE O/P EST LOW 20 MIN: CPT | Performed by: STUDENT IN AN ORGANIZED HEALTH CARE EDUCATION/TRAINING PROGRAM

## 2025-06-25 RX ORDER — M-VIT,TX,IRON,MINS/CALC/FOLIC 27MG-0.4MG
1 TABLET ORAL DAILY
COMMUNITY

## 2025-06-25 RX ORDER — PROPRANOLOL HYDROCHLORIDE 60 MG/1
60 TABLET ORAL 2 TIMES DAILY
COMMUNITY
Start: 2025-05-19